# Patient Record
(demographics unavailable — no encounter records)

---

## 2025-05-13 NOTE — DISCUSSION/SUMMARY
[de-identified] : The underlying pathophysiology was reviewed with the patient. XR films were reviewed with the patient. Discussed at length the nature of the patients condition. The right knee symptoms are secondary to Medial and patellofemoral compartment osteoarthritis  Treatment options were discussed including; observation, NSAIDS, analgesics, cortisone/gel injections, physical therapy, and surgical intervention.  I advised to try conservative OTC treatment first.  Gentle range of motion, stretching, and strengthening exercises were encouraged. Increase activity as tolerated. Walk regularly (every hour 5-10 minutes). Patient may continue participating in all physical activities without restrictions, as tolerated.  Patient was advised to try OTC medication and topical analgesics for pain management. I recommend that the patient utilize Tylenol, Advil, Aleve, Voltaren gel, Icy Hot, Biofreeze, Bengay, or 4% lidocaine patches.  All questions answered, understanding verbalized. Patient in agreement with plan of care. The patient can follow-up in 4 week or as needed.  If the patient begins to experience any changes or severe exacerbation of her symptoms, she should reach out to me as soon as possible.

## 2025-05-13 NOTE — HISTORY OF PRESENT ILLNESS
[de-identified] : Pt is an 85 y/o female c/o right knee pain x 8 days.  She denies injury.  She states that the pain progressed last week and on Friday she went to Urgent Care where xrays were negative for fracture.  She states that she knows that she has osteoarthritis.  She has pain with walking and standing.  She does not have pain with sitting or laying down.  The pain improved over the weekend but it did not resolve. She was advised to follow up with a specialist.    She takes diclofenac QD. She has a stress fracture in the right knee in 2017. Denies diabetes.

## 2025-05-13 NOTE — ADDENDUM
[FreeTextEntry1] : I, Mike Delgadillo wrote this note acting as a scribe for Dr. Spencer Holley on 05/13/2025.   All medical record entries made by the Scribe were at my, Dr. Spencer Holley MD., direction and personally dictated by me on 05/13/2025. I have personally reviewed the chart and agree that the record accurately reflects my personal performance of the history, physical exam, assessment and plan.

## 2025-05-13 NOTE — PHYSICAL EXAM
[de-identified] : Patient is WDWN, alert, and in no acute distress. Breathing is unlabored. She is grossly oriented to person, place, and time. She is accompanied by her  and ambulating freely.  Right knee: There is medial joint line tenderness. No swelling, no valgus or valgus instability present on provocative testing. Range of motion: Full with pain Tests and Signs: All tests for stability are normal. Strength:  [de-identified] :  Procedure was performed at the [facility] EXAM: KNEE RIGHT PROCEDURE DATE: 05/09/2025 INTERPRETATION: EXAMINATION: 3 views of the right knee CLINICAL INFORMATION: Right knee pain IMPRESSION: No acute fracture or dislocation. Medial and patellofemoral compartment osteoarthritis. Small joint effusion.  --- End of Report ---